# Patient Record
Sex: MALE | Race: WHITE | NOT HISPANIC OR LATINO | Employment: FULL TIME | URBAN - METROPOLITAN AREA
[De-identification: names, ages, dates, MRNs, and addresses within clinical notes are randomized per-mention and may not be internally consistent; named-entity substitution may affect disease eponyms.]

---

## 2021-08-17 ENCOUNTER — HOSPITAL ENCOUNTER (EMERGENCY)
Facility: HOSPITAL | Age: 26
Discharge: HOME | End: 2021-08-17
Attending: EMERGENCY MEDICINE
Payer: COMMERCIAL

## 2021-08-17 VITALS
SYSTOLIC BLOOD PRESSURE: 116 MMHG | RESPIRATION RATE: 16 BRPM | HEART RATE: 65 BPM | WEIGHT: 150 LBS | DIASTOLIC BLOOD PRESSURE: 70 MMHG | OXYGEN SATURATION: 100 % | TEMPERATURE: 98 F | HEIGHT: 66 IN | BODY MASS INDEX: 24.11 KG/M2

## 2021-08-17 DIAGNOSIS — S84.12XA INJURY OF LEFT PERONEAL NERVE, INITIAL ENCOUNTER: ICD-10-CM

## 2021-08-17 DIAGNOSIS — S81.812A LACERATION OF LEFT LOWER EXTREMITY, INITIAL ENCOUNTER: Primary | ICD-10-CM

## 2021-08-17 PROCEDURE — 12002 RPR S/N/AX/GEN/TRNK2.6-7.5CM: CPT | Mod: 59,RT

## 2021-08-17 PROCEDURE — 25000003 PHARM REV CODE 250: Performed by: EMERGENCY MEDICINE

## 2021-08-17 PROCEDURE — 99284 EMERGENCY DEPT VISIT MOD MDM: CPT | Mod: 25

## 2021-08-17 PROCEDURE — 13121 CMPLX RPR S/A/L 2.6-7.5 CM: CPT

## 2021-08-17 PROCEDURE — 63600175 PHARM REV CODE 636 W HCPCS: Performed by: EMERGENCY MEDICINE

## 2021-08-17 PROCEDURE — 12034 INTMD RPR S/TR/EXT 7.6-12.5: CPT | Mod: LT

## 2021-08-17 PROCEDURE — 96372 THER/PROPH/DIAG INJ SC/IM: CPT | Mod: 59

## 2021-08-17 PROCEDURE — 13122 CMPLX RPR S/A/L ADDL 5 CM/>: CPT

## 2021-08-17 PROCEDURE — 29515 APPLICATION SHORT LEG SPLINT: CPT | Mod: LT

## 2021-08-17 RX ORDER — CEPHALEXIN 500 MG/1
500 CAPSULE ORAL EVERY 8 HOURS
Qty: 21 CAPSULE | Refills: 0 | Status: SHIPPED | OUTPATIENT
Start: 2021-08-17 | End: 2021-08-24

## 2021-08-17 RX ORDER — LIDOCAINE HYDROCHLORIDE AND EPINEPHRINE 10; 10 MG/ML; UG/ML
15 INJECTION, SOLUTION INFILTRATION; PERINEURAL ONCE
Status: COMPLETED | OUTPATIENT
Start: 2021-08-17 | End: 2021-08-17

## 2021-08-17 RX ORDER — FENTANYL CITRATE 50 UG/ML
100 INJECTION, SOLUTION INTRAMUSCULAR; INTRAVENOUS
Status: COMPLETED | OUTPATIENT
Start: 2021-08-17 | End: 2021-08-17

## 2021-08-17 RX ORDER — HYDROCODONE BITARTRATE AND ACETAMINOPHEN 5; 325 MG/1; MG/1
1 TABLET ORAL EVERY 4 HOURS PRN
Qty: 18 TABLET | Refills: 0 | Status: SHIPPED | OUTPATIENT
Start: 2021-08-17

## 2021-08-17 RX ORDER — CEPHALEXIN 500 MG/1
500 CAPSULE ORAL
Status: COMPLETED | OUTPATIENT
Start: 2021-08-17 | End: 2021-08-17

## 2021-08-17 RX ADMIN — FENTANYL CITRATE 100 MCG: 50 INJECTION, SOLUTION INTRAMUSCULAR; INTRAVENOUS at 02:08

## 2021-08-17 RX ADMIN — CEPHALEXIN 500 MG: 500 CAPSULE ORAL at 04:08

## 2021-08-17 RX ADMIN — LIDOCAINE HYDROCHLORIDE AND EPINEPHRINE 15 ML: 10; 10 INJECTION, SOLUTION INFILTRATION; PERINEURAL at 02:08

## 2021-08-19 ENCOUNTER — TELEPHONE (OUTPATIENT)
Dept: ADMINISTRATIVE | Facility: OTHER | Age: 26
End: 2021-08-19

## 2021-09-14 ENCOUNTER — TELEPHONE (OUTPATIENT)
Dept: ADMINISTRATIVE | Facility: OTHER | Age: 26
End: 2021-09-14

## 2021-09-29 ENCOUNTER — OFFICE VISIT (OUTPATIENT)
Dept: ORTHOPEDICS | Facility: CLINIC | Age: 26
End: 2021-09-29
Payer: COMMERCIAL

## 2021-09-29 ENCOUNTER — HOSPITAL ENCOUNTER (OUTPATIENT)
Dept: RADIOLOGY | Facility: HOSPITAL | Age: 26
Discharge: HOME | End: 2021-09-29
Attending: ORTHOPAEDIC SURGERY
Payer: COMMERCIAL

## 2021-09-29 VITALS — WEIGHT: 152.56 LBS | HEIGHT: 65 IN | BODY MASS INDEX: 25.42 KG/M2

## 2021-09-29 DIAGNOSIS — M25.572 LEFT ANKLE PAIN, UNSPECIFIED CHRONICITY: ICD-10-CM

## 2021-09-29 DIAGNOSIS — M25.572 LEFT ANKLE PAIN, UNSPECIFIED CHRONICITY: Primary | ICD-10-CM

## 2021-09-29 DIAGNOSIS — S81.812S LACERATION OF LEFT LOWER LEG, SEQUELA: Primary | ICD-10-CM

## 2021-09-29 PROCEDURE — 99999 PR PBB SHADOW E&M-EST. PATIENT-LVL II: ICD-10-PCS | Mod: PBBFAC,,, | Performed by: ORTHOPAEDIC SURGERY

## 2021-09-29 PROCEDURE — 99203 OFFICE O/P NEW LOW 30 MIN: CPT | Mod: S$GLB,,, | Performed by: ORTHOPAEDIC SURGERY

## 2021-09-29 PROCEDURE — 3008F PR BODY MASS INDEX (BMI) DOCUMENTED: ICD-10-PCS | Mod: CPTII,S$GLB,, | Performed by: ORTHOPAEDIC SURGERY

## 2021-09-29 PROCEDURE — 99999 PR PBB SHADOW E&M-EST. PATIENT-LVL II: CPT | Mod: PBBFAC,,, | Performed by: ORTHOPAEDIC SURGERY

## 2021-09-29 PROCEDURE — 1159F MED LIST DOCD IN RCRD: CPT | Mod: CPTII,S$GLB,, | Performed by: ORTHOPAEDIC SURGERY

## 2021-09-29 PROCEDURE — 3008F BODY MASS INDEX DOCD: CPT | Mod: CPTII,S$GLB,, | Performed by: ORTHOPAEDIC SURGERY

## 2021-09-29 PROCEDURE — 1159F PR MEDICATION LIST DOCUMENTED IN MEDICAL RECORD: ICD-10-PCS | Mod: CPTII,S$GLB,, | Performed by: ORTHOPAEDIC SURGERY

## 2021-09-29 PROCEDURE — 99203 PR OFFICE/OUTPT VISIT, NEW, LEVL III, 30-44 MIN: ICD-10-PCS | Mod: S$GLB,,, | Performed by: ORTHOPAEDIC SURGERY

## 2022-05-27 ENCOUNTER — HOSPITAL ENCOUNTER (EMERGENCY)
Facility: HOSPITAL | Age: 27
Discharge: HOME | End: 2022-05-27
Attending: EMERGENCY MEDICINE
Payer: COMMERCIAL

## 2022-05-27 VITALS
DIASTOLIC BLOOD PRESSURE: 75 MMHG | WEIGHT: 150 LBS | OXYGEN SATURATION: 97 % | TEMPERATURE: 98 F | SYSTOLIC BLOOD PRESSURE: 120 MMHG | HEART RATE: 67 BPM | HEIGHT: 65 IN | BODY MASS INDEX: 24.99 KG/M2 | RESPIRATION RATE: 14 BRPM

## 2022-05-27 DIAGNOSIS — S61.432A PUNCTURE WOUND OF LEFT HAND WITHOUT FOREIGN BODY, INITIAL ENCOUNTER: Primary | ICD-10-CM

## 2022-05-27 PROCEDURE — 99283 EMERGENCY DEPT VISIT LOW MDM: CPT | Mod: 25

## 2022-05-27 PROCEDURE — 25000003 PHARM REV CODE 250: Performed by: EMERGENCY MEDICINE

## 2022-05-27 RX ORDER — HYDROCODONE BITARTRATE AND ACETAMINOPHEN 5; 325 MG/1; MG/1
1 TABLET ORAL
Status: COMPLETED | OUTPATIENT
Start: 2022-05-27 | End: 2022-05-27

## 2022-05-27 RX ADMIN — HYDROCODONE BITARTRATE AND ACETAMINOPHEN 1 TABLET: 5; 325 TABLET ORAL at 10:05

## 2022-05-27 RX ADMIN — NEOMYCIN AND POLYMYXIN B SULFATES AND BACITRACIN ZINC 1 EACH: 400; 3.5; 5 OINTMENT TOPICAL at 11:05

## 2022-05-28 NOTE — ED NOTES
APPEARANCE: Awake, alert, & oriented. No acute distress.  CARDIAC: Normal rate and rhythm. Denies chest pain.     RESPIRATORY: Respirations are even and unlabored no obvious signs of distress. No accessory muscle use. Breath sounds clear bilaterally throughout chest.  PERIPHERAL VASCULAR: peripheral pulses present. Normal cap refill. No edema.   GASTRO: soft, no tenderness, no abdominal distention.  MUSC: Full ROM. No bony tenderness or soft tissue tenderness except left ring finger that has wound to nail bed. Swelling to tip of left 4th digit with cap refill intact to posterior finger pad but tip of finger is white. Sensation and motion intact. No obvious deformity.  SKIN: Skin is warm, dry, and intact. Normal skin turgor and color.  NEURO: Equal strength bilaterally. Prakash coma scale: Eye Response-4, Motor Response-6, Verbal Response-5. Total=15. Clear speech. No neurological abnormalities.   EENT: No c/o vision or hearing difficulties. Oropharynx clear.

## 2022-05-28 NOTE — DISCHARGE INSTRUCTIONS
Please take Tylenol Motrin as needed for pain.  Please use Triple antibiotic ointment.  Follow-up with primary care return the ER for any concerning reason.

## 2022-05-28 NOTE — ED PROVIDER NOTES
Encounter Date: 5/27/2022    SCRIBE #1 NOTE: I, Malachi Cruz, am scribing for, and in the presence of, Dr. Oglesby.       History     Chief Complaint   Patient presents with    Puncture Wound     States nail went through left 3rd finger. Nail is cracked.     Garfield Kern is a 26 y.o. male who presents to the emergency department for evaluation of a puncture wound to his left third finger. Patient reports that he was using a nail gun at work when a nail went through a piece of wood and through the tip of his finger and nailbed. He reports that he was able to pull the nail out after. He now complains of worsening pain to the area. Patient's tetanus is up to date.    The history is provided by the patient. No  was used.     Review of patient's allergies indicates:  No Known Allergies  No past medical history on file.  No past surgical history on file.  No family history on file.  Social History     Tobacco Use    Smoking status: Current Some Day Smoker     Types: Cigarettes    Smokeless tobacco: Never Used     Review of Systems   Constitutional: Negative for fever.   HENT: Negative for sore throat.    Eyes: Negative for redness.   Respiratory: Negative for shortness of breath.    Cardiovascular: Negative for chest pain.   Gastrointestinal: Negative for diarrhea, nausea and vomiting.   Genitourinary: Negative for dysuria and hematuria.   Musculoskeletal: Negative for back pain.   Skin: Positive for wound. Negative for rash.   Neurological: Negative for headaches.   All other systems reviewed and are negative.      Physical Exam     Initial Vitals [05/27/22 2130]   BP Pulse Resp Temp SpO2   119/69 73 18 98 °F (36.7 °C) 98 %      MAP       --         Physical Exam    Nursing note and vitals reviewed.  Constitutional: He appears well-developed and well-nourished. He is not diaphoretic. No distress.   HENT:   Head: Normocephalic and atraumatic.   Pulmonary/Chest: No respiratory distress.    Musculoskeletal:         General: Normal range of motion.      Left hand: Normal sensation. Normal capillary refill.      Comments: Puncture wound noted to the distal tip of the left 3rd digit with a second puncture wound noted in the nailbed. Small subungual hematoma noted. Normal capillary refill. Normal flexion and extension. Normal sensation.     Neurological: He is alert and oriented to person, place, and time. GCS score is 15. GCS eye subscore is 4. GCS verbal subscore is 5. GCS motor subscore is 6.   Skin: Skin is warm and dry. Capillary refill takes less than 2 seconds. No rash noted. No pallor.   Psychiatric: He has a normal mood and affect. Thought content normal.         ED Course   Procedures  Labs Reviewed - No data to display       Imaging Results          X-Ray Hand 3 view Left (Final result)  Result time 05/27/22 22:40:20    Final result by Walt Qureshi MD (05/27/22 22:40:20)                 Impression:      Negative left hand.    No evidence of fracture or foreign body.      Electronically signed by: Walt Qureshi  Date:    05/27/2022  Time:    22:40             Narrative:    EXAMINATION:  XR HAND COMPLETE 3 VIEW LEFT    CLINICAL HISTORY:  punture wound 3rd digit dip with nail;.    TECHNIQUE:  PA, lateral, and oblique views of the left hand were performed.    COMPARISON:  None    FINDINGS:  Bones, joint spaces and soft tissues appear intact.  There is no evidence of fracture or foreign body.                                 Medications   HYDROcodone-acetaminophen 5-325 mg per tablet 1 tablet (1 tablet Oral Given 5/27/22 2211)   neomycin-bacitracnZn-polymyxnB packet 1 each (1 each Topical (Top) Given 5/27/22 2308)     Medical Decision Making:   Initial Assessment:   Pleasant 26-year-old male denies medical history presents the ER for evaluation of 3rd digit left hand injury.  Accident with a nail gun.  Patient was using a nail gun, nail went through would and then hit his middle finger on  the other side.  He was able to pull the nail out of his finger.  But since then some pain discomfort knee came the ER for further evaluation.  Penetrating DI P injury noted no neurovascular compromise small subungual hematoma but it is self draining.  Will plan x-ray imaging pain control.  Tetanus is up-to-date.  Likely discharge.  Clinical Tests:   Radiological Study: Ordered and Reviewed          Scribe Attestation:   Scribe #1: I performed the above scribed service and the documentation accurately describes the services I performed. I attest to the accuracy of the note.                   Clinical Impression:   Final diagnoses:  [Z58.432A] Puncture wound of left hand without foreign body, initial encounter (Primary)          ED Disposition Condition    Discharge Stable        ED Prescriptions     None        Follow-up Information     Follow up With Specialties Details Why Contact Info Additional Information    [facility] Family [facility] Schedule an appointment as soon as possible for a visit  As needed [address], [address]  [facility] [address] [phone] Please park in [address] or D and use [address]. Take Medical Office Bldg. elevators.        My Scribe Attestation: I acknowledge that the documentation on this chart was provided by described on the date of service noted above and that the documentation in the chart accurately reflects work and decisions made by me alone.       Brandin Oglesby MD  05/28/22 0424

## 2024-05-14 ENCOUNTER — ON-DEMAND VIRTUAL (OUTPATIENT)
Dept: URGENT CARE | Facility: CLINIC | Age: 29
End: 2024-05-14
Payer: COMMERCIAL

## 2024-05-14 DIAGNOSIS — L03.115 CELLULITIS OF RIGHT LOWER EXTREMITY: Primary | ICD-10-CM

## 2024-05-14 PROCEDURE — 99203 OFFICE O/P NEW LOW 30 MIN: CPT | Mod: 95,,,

## 2024-05-14 RX ORDER — MUPIROCIN 20 MG/G
OINTMENT TOPICAL 3 TIMES DAILY
Qty: 22 G | Refills: 0 | Status: SHIPPED | OUTPATIENT
Start: 2024-05-14

## 2024-05-14 RX ORDER — DOXYCYCLINE 100 MG/1
100 CAPSULE ORAL 2 TIMES DAILY
Qty: 14 CAPSULE | Refills: 0 | Status: SHIPPED | OUTPATIENT
Start: 2024-05-14 | End: 2024-05-21

## 2024-05-15 NOTE — PROGRESS NOTES
Subjective:      Patient ID: Garfield Kern is a 28 y.o. male at home    Vitals:  vitals were not taken for this visit.     Chief Complaint: Insect Bite      Visit Type: TELE AUDIOVISUAL    Present with the patient at the time of consultation: TELEMED PRESENT WITH PATIENT: None    History reviewed. No pertinent past medical history.  History reviewed. No pertinent surgical history.  Review of patient's allergies indicates:  No Known Allergies  Current Outpatient Medications on File Prior to Visit   Medication Sig Dispense Refill    HYDROcodone-acetaminophen (NORCO) 5-325 mg per tablet Take 1 tablet by mouth every 4 (four) hours as needed for Pain. (Patient not taking: Reported on 9/29/2021) 18 tablet 0     No current facility-administered medications on file prior to visit.     No family history on file.    Medications Ordered                [id number] - [place] [address] [address]    Telephone: [phone]   Fax: [phone]   Hours: Not open 24 hours                         E-Prescribed (2 of 2)              doxycycline (VIBRAMYCIN) 100 MG Cap    Sig: Take 1 capsule (100 mg total) by mouth 2 (two) times daily. for 7 days       Start: 5/14/24     Quantity: 14 capsule Refills: 0                         mupirocin (BACTROBAN) 2 % ointment    Sig: Apply topically 3 (three) times daily.       Start: 5/14/24     Quantity: 22 g Refills: 0                           Ohs Peq Odvv Intake    5/14/2024  7:04 PM CDT - Filed by Patient   What is your current physical address in the event of a medical emergency? [address] [address]   Are you able to take your vital signs? Yes   Systolic Blood Pressure: 115   Diastolic Blood Pressure: 65   Weight: 165   Height: 66   Pulse: 86   Temperature: 98.5   Respiration rate: 24   Pulse Oxygen:    Please attach any relevant images or files          Patient states that Sunday he noticed what appeared to be an ant bite on his right ankle  area. Patient states that yesterday the area began to swell and have increased erythema with warmth and inflammation. Patient states that the area has continued to get worse. Patient states that he has been putting neosporin on the area and keeping it covered. Patient denies any fever or other symptoms at this time.         Constitution: Negative.   HENT: Negative.     Neck: neck negative.   Cardiovascular: Negative.    Eyes: Negative.    Respiratory: Negative.     Gastrointestinal: Negative.    Endocrine: negative.   Genitourinary: Negative.    Musculoskeletal: Negative.    Skin:  Positive for wound and erythema.   Allergic/Immunologic: Negative.    Neurological: Negative.    Hematologic/Lymphatic: Negative.    Psychiatric/Behavioral: Negative.          Objective:   The physical exam was conducted virtually.  Physical Exam   Constitutional: He is oriented to person, place, and time.   HENT:   Head: Normocephalic and atraumatic.   Nose: Nose normal.   Eyes: Conjunctivae are normal. Pupils are equal, round, and reactive to light. Extraocular movement intact   Neck: Neck supple.   Pulmonary/Chest: Effort normal.   Abdominal: Normal appearance.   Musculoskeletal: Normal range of motion.         General: Normal range of motion.   Neurological: no focal deficit. He is alert, oriented to person, place, and time and at baseline.   Skin: erythema         Comments: See image   Psychiatric: His behavior is normal. Mood, judgment and thought content normal.       Assessment:     1. Cellulitis of right lower extremity        Plan:       Cellulitis of right lower extremity  -     mupirocin (BACTROBAN) 2 % ointment; Apply topically 3 (three) times daily.  Dispense: 22 g; Refill: 0  -     doxycycline (VIBRAMYCIN) 100 MG Cap; Take 1 capsule (100 mg total) by mouth 2 (two) times daily. for 7 days  Dispense: 14 capsule; Refill: 0

## 2024-05-15 NOTE — PATIENT INSTRUCTIONS
Keep areas clean and dry.  Avoid scratching areas and perform good hand hygiene.    Use antibacterial soap  Keep the areas covered and avoid contact with others.  Follow-up with PCP or dermatology if no improvement in symptoms or for any worsening of symptoms.

## 2025-03-25 ENCOUNTER — ON-DEMAND VIRTUAL (OUTPATIENT)
Dept: URGENT CARE | Facility: CLINIC | Age: 30
End: 2025-03-25
Payer: COMMERCIAL

## 2025-03-25 DIAGNOSIS — R11.2 NAUSEA AND VOMITING, UNSPECIFIED VOMITING TYPE: Primary | ICD-10-CM

## 2025-03-25 DIAGNOSIS — K52.9 GASTROENTERITIS: ICD-10-CM

## 2025-03-25 RX ORDER — ONDANSETRON 4 MG/1
4 TABLET, ORALLY DISINTEGRATING ORAL EVERY 8 HOURS PRN
Qty: 9 TABLET | Refills: 0 | Status: SHIPPED | OUTPATIENT
Start: 2025-03-25 | End: 2025-03-28

## 2025-03-25 NOTE — PATIENT INSTRUCTIONS

## 2025-03-25 NOTE — PROGRESS NOTES
Subjective:      Patient ID: Garfield Kern is a 29 y.o. male.    Vitals:  vitals were not taken for this visit.     Chief Complaint: nausea and vomiting      Visit Type: TELE AUDIOVISUAL    Patient [place]     Present with the patient at the time of consultation: TELEMED PRESENT WITH PATIENT: None    History reviewed. No pertinent past medical history.  History reviewed. No pertinent surgical history.  Review of patient's allergies indicates:  No Known Allergies  Medications Ordered Prior to Encounter[1]  No family history on file.    Medications Ordered                [id number] - [place] - [address]   [address] [address]    Telephone: [phone]   Fax: [phone]   Hours: Not open 24 hours                         E-Prescribed (1 of 1)              ondansetron (ZOFRAN-ODT) 4 MG TbDL    Sig: Take 1 tablet (4 mg total) by mouth every 8 (eight) hours as needed (for nausea or vomiting).       Start: 3/25/25     Quantity: 9 tablet Refills: 0                           Ohs Peq Odvv Intake    3/25/2025  2:53 PM CDT - Filed by Patient   What is your current physical address in the event of a medical emergency? [address]   Are you able to take your vital signs? No   Please attach any relevant images or files    Is your employer contracted with [facility]? No         29 Y.o. M with c/o  nvd x 1 day. Exposed to son with stomach bug. He has taken some ibuprofen. Denies fever, CP, SOB, ha.         Constitution: Negative for fatigue and fever.   HENT:  Negative for congestion.    Cardiovascular:  Negative for chest pain.   Respiratory:  Negative for shortness of breath.    Gastrointestinal:  Positive for abdominal pain, nausea, vomiting and diarrhea.   Neurological:  Positive for dizziness. Negative for headaches.        Objective:   The physical exam was conducted virtually.  Physical Exam   Constitutional: He is oriented to  person, place, and time. No distress.   HENT:   Head: Normocephalic.   Ears:   Right Ear: External ear normal.   Left Ear: External ear normal.   Eyes: Conjunctivae are normal.   Pulmonary/Chest: Effort normal. No respiratory distress.   Neurological: He is alert and oriented to person, place, and time.       Assessment:     1. Nausea and vomiting, unspecified vomiting type    2. Gastroenteritis        Plan:   Stay Hydrated: Use electrolyte-rich fluids such as Gatorade, Pedialyte, coconut water, or rehydration packets to help replenish lost fluids and electrolytes. These fluids are more effective at rehydration compared to plain water or vitamin water.  Increase Clear Liquids: Consume clear liquids such as water, Gatorade, Pedialyte, broths, and jello to maintain hydration. It's advisable to hold off on solid foods for 12-18 hours and then gradually advance to the BRAT diet (banana, rice, applesauce, tea, toast/crackers), followed by further food advancement as tolerated.  Use of Pepto-Bismol: Pepto-Bismol can help alleviate symptoms of diarrhea. However, it's important to use it as directed and be aware of any potential side effects. Can take kaopectate for diarrhea relief.    These recommendations aim to address symptoms of diarrhea and dehydration while providing guidance on fluid and diet management. If symptoms persist or worsen, it's advisable to seek medical attention for further evaluation and treatment. Additionally, it's essential to follow any specific instructions provided by a healthcare professional based on individual health conditions and circumstances.    Nausea and vomiting, unspecified vomiting type  -     ondansetron (ZOFRAN-ODT) 4 MG TbDL; Take 1 tablet (4 mg total) by mouth every 8 (eight) hours as needed (for nausea or vomiting).  Dispense: 9 tablet; Refill: 0    Gastroenteritis  -     ondansetron (ZOFRAN-ODT) 4 MG TbDL; Take 1 tablet (4 mg total) by mouth every 8 (eight) hours as needed (for  nausea or vomiting).  Dispense: 9 tablet; Refill: 0    We appreciate you trusting us with your medical care. We hope you feel better soon. We will be happy to take care of you for all of your future medical needs.     You must understand that you've received Virtual treatment only and that you may be released before all your medical problems are known or treated. You, the patient, will arrange for follow up care as instructed.     Follow up with your PCP or specialty clinic as directed in the next 1-2 days if not improved or as needed. You can call [phone] to schedule an appointment with the appropriate provider.     If your condition worsens we recommend that you receive another evaluation in person, with your primary care provider, urgent care or at the emergency room immediately or contact your primary medical clinics after hours call service to discuss your concerns.                     [1]   Current Outpatient Medications on File Prior to Visit   Medication Sig Dispense Refill    HYDROcodone-acetaminophen (NORCO) 5-325 mg per tablet Take 1 tablet by mouth every 4 (four) hours as needed for Pain. (Patient not taking: Reported on 9/29/2021) 18 tablet 0    mupirocin (BACTROBAN) 2 % ointment Apply topically 3 (three) times daily. 22 g 0     No current facility-administered medications on file prior to visit.